# Patient Record
Sex: MALE | Race: WHITE | NOT HISPANIC OR LATINO
[De-identification: names, ages, dates, MRNs, and addresses within clinical notes are randomized per-mention and may not be internally consistent; named-entity substitution may affect disease eponyms.]

---

## 2020-06-05 PROBLEM — Z00.00 ENCOUNTER FOR PREVENTIVE HEALTH EXAMINATION: Status: ACTIVE | Noted: 2020-06-05

## 2020-06-17 ENCOUNTER — APPOINTMENT (OUTPATIENT)
Dept: FAMILY MEDICINE | Facility: CLINIC | Age: 45
End: 2020-06-17
Payer: COMMERCIAL

## 2020-06-17 VITALS
HEART RATE: 74 BPM | OXYGEN SATURATION: 98 % | DIASTOLIC BLOOD PRESSURE: 80 MMHG | HEIGHT: 67 IN | BODY MASS INDEX: 28.72 KG/M2 | SYSTOLIC BLOOD PRESSURE: 112 MMHG | TEMPERATURE: 98.5 F | WEIGHT: 183 LBS | RESPIRATION RATE: 14 BRPM

## 2020-06-17 DIAGNOSIS — R51 HEADACHE: ICD-10-CM

## 2020-06-17 DIAGNOSIS — M54.2 CERVICALGIA: ICD-10-CM

## 2020-06-17 DIAGNOSIS — H53.8 OTHER VISUAL DISTURBANCES: ICD-10-CM

## 2020-06-17 DIAGNOSIS — Z82.3 FAMILY HISTORY OF STROKE: ICD-10-CM

## 2020-06-17 DIAGNOSIS — Z78.9 OTHER SPECIFIED HEALTH STATUS: ICD-10-CM

## 2020-06-17 DIAGNOSIS — M43.6 TORTICOLLIS: ICD-10-CM

## 2020-06-17 DIAGNOSIS — Z83.3 FAMILY HISTORY OF DIABETES MELLITUS: ICD-10-CM

## 2020-06-17 DIAGNOSIS — Z83.6 FAMILY HISTORY OF OTHER DISEASES OF THE RESPIRATORY SYSTEM: ICD-10-CM

## 2020-06-17 DIAGNOSIS — Z80.1 FAMILY HISTORY OF MALIGNANT NEOPLASM OF TRACHEA, BRONCHUS AND LUNG: ICD-10-CM

## 2020-06-17 DIAGNOSIS — Z76.89 PERSONS ENCOUNTERING HEALTH SERVICES IN OTHER SPECIFIED CIRCUMSTANCES: ICD-10-CM

## 2020-06-17 DIAGNOSIS — Z80.8 FAMILY HISTORY OF MALIGNANT NEOPLASM OF OTHER ORGANS OR SYSTEMS: ICD-10-CM

## 2020-06-17 PROCEDURE — 99203 OFFICE O/P NEW LOW 30 MIN: CPT

## 2020-06-17 RX ORDER — CYCLOBENZAPRINE HYDROCHLORIDE 5 MG/1
5 TABLET, FILM COATED ORAL
Qty: 21 | Refills: 0 | Status: ACTIVE | COMMUNITY
Start: 2020-06-17 | End: 1900-01-01

## 2020-06-17 NOTE — HEALTH RISK ASSESSMENT
[Smoke Detector] : smoke detector [Carbon Monoxide Detector] : carbon monoxide detector [Seat Belt] :  uses seat belt

## 2020-06-17 NOTE — PHYSICAL EXAM
[No Lymphadenopathy] : no lymphadenopathy [Normal] : normal rate, regular rhythm, normal S1 and S2 and no murmur heard [Supple] : supple [No Edema] : there was no peripheral edema [No Focal Deficits] : no focal deficits [Normal Affect] : the affect was normal [Normal Insight/Judgement] : insight and judgment were intact [de-identified] : limited rom of neck while turning to left side, no pain on palpation of spine or paraspinal muscles no edema or erythema or warmth or nuchal rigidity  [Normal Mood] : the mood was normal [de-identified] : CN 2-12 INTACT, NORMAL STRENGTH UPPER AND LOWER EXT B/L 5/5, NORMAL RAPID ALTERNATING MOVEMENTS AND FINGER TO NOSE

## 2020-06-17 NOTE — HISTORY OF PRESENT ILLNESS
[FreeTextEntry1] : patient presents to establish care  [de-identified] : 45yo male presents for est care and headache w/ blurry vision\par \par he said 3 nights ago he had migraine headaches w/ blurry vision \par he thinks they were from him being on his cell phone for a long period of time\par he bought readings glasses and said he hasn’t had a headache since\par he has an appointment w/ an ophthalmologist for next week\par he said he is on his phone a lot but he only decreased his usage for 3 days and now it has been fine and is back to normal usage \par \par c/o throbbing   pain,  6-7   /10  constant, +blurry vision occurred right at the time when the pain started, denies double vision, alleviated by time only, aggravated by   light not by sound \par he sat in a quiet dark room and he fell asleep and increased his fluids\par \par associated with nausea and felt the room spinning\par denies headaches waking him out of sleep\par he said the 2 other headaches happened after looking at his phone\par his wife said he has been holding his phone a an arms length for a while \par \par denies ever having this happen before\par denies paresthesia or weakness or confusion or balance issues\par \par c/o chronic neck pain x 10 years and getting worse, sharp pain,  7 /10 intermittent  constant  , alleviated by medical massage   , aggravated by  movement \par denies pain w/resting \par \par he hasn’t had medical massage in 3 mos \par \par denies fevers, chills, chest pain or sob\par

## 2020-06-17 NOTE — PLAN
[FreeTextEntry1] : \par est care\par \par Headache w/ blurry vision now improved but new onset and vision changes r/o mass vs due to vision issues?\par MRI brain with and without contrast\par opthalmologist appointment scheduled\par continue reading glasses use\par \par chronic neck pain\par PT\par flexeril 5mg 1 tab in daytime if not driving and 2 tabs at bedtime prn\par light stretching w/ heating pad [id number] advised do not sleep w/ it on\par \par \par f/u 2 weeks for cpe pt agreed w/plan \par

## 2020-06-19 ENCOUNTER — OUTPATIENT (OUTPATIENT)
Dept: OUTPATIENT SERVICES | Facility: HOSPITAL | Age: 45
LOS: 1 days | End: 2020-06-19
Payer: COMMERCIAL

## 2020-06-19 ENCOUNTER — APPOINTMENT (OUTPATIENT)
Dept: MRI IMAGING | Facility: CLINIC | Age: 45
End: 2020-06-19
Payer: COMMERCIAL

## 2020-06-19 ENCOUNTER — APPOINTMENT (OUTPATIENT)
Dept: RADIOLOGY | Facility: CLINIC | Age: 45
End: 2020-06-19
Payer: COMMERCIAL

## 2020-06-19 DIAGNOSIS — R51 HEADACHE: ICD-10-CM

## 2020-06-19 DIAGNOSIS — H53.8 OTHER VISUAL DISTURBANCES: ICD-10-CM

## 2020-06-19 DIAGNOSIS — M54.2 CERVICALGIA: ICD-10-CM

## 2020-06-19 DIAGNOSIS — M43.6 TORTICOLLIS: ICD-10-CM

## 2020-06-19 PROCEDURE — 72052 X-RAY EXAM NECK SPINE 6/>VWS: CPT | Mod: 26

## 2020-06-19 PROCEDURE — 70553 MRI BRAIN STEM W/O & W/DYE: CPT

## 2020-06-19 PROCEDURE — 70553 MRI BRAIN STEM W/O & W/DYE: CPT | Mod: 26

## 2020-06-19 PROCEDURE — A9585: CPT

## 2020-06-19 PROCEDURE — 72052 X-RAY EXAM NECK SPINE 6/>VWS: CPT

## 2020-09-04 ENCOUNTER — APPOINTMENT (OUTPATIENT)
Dept: FAMILY MEDICINE | Facility: CLINIC | Age: 45
End: 2020-09-04